# Patient Record
Sex: MALE | Race: WHITE | NOT HISPANIC OR LATINO | Employment: FULL TIME | URBAN - METROPOLITAN AREA
[De-identification: names, ages, dates, MRNs, and addresses within clinical notes are randomized per-mention and may not be internally consistent; named-entity substitution may affect disease eponyms.]

---

## 2024-03-31 ENCOUNTER — HOSPITAL ENCOUNTER (EMERGENCY)
Facility: CLINIC | Age: 39
Discharge: HOME | End: 2024-03-31
Attending: PHYSICIAN ASSISTANT | Admitting: PHYSICIAN ASSISTANT
Payer: COMMERCIAL

## 2024-03-31 ENCOUNTER — APPOINTMENT (OUTPATIENT)
Dept: CT IMAGING | Facility: CLINIC | Age: 39
End: 2024-03-31
Attending: PHYSICIAN ASSISTANT
Payer: COMMERCIAL

## 2024-03-31 ENCOUNTER — NURSE TRIAGE (OUTPATIENT)
Dept: NURSING | Facility: CLINIC | Age: 39
End: 2024-03-31
Payer: COMMERCIAL

## 2024-03-31 VITALS
RESPIRATION RATE: 18 BRPM | DIASTOLIC BLOOD PRESSURE: 76 MMHG | WEIGHT: 245 LBS | OXYGEN SATURATION: 99 % | HEART RATE: 73 BPM | SYSTOLIC BLOOD PRESSURE: 130 MMHG | BODY MASS INDEX: 32.47 KG/M2 | HEIGHT: 73 IN | TEMPERATURE: 98 F

## 2024-03-31 DIAGNOSIS — L02.214 SOFT TISSUE ABSCESS OF INGUINAL REGION: ICD-10-CM

## 2024-03-31 LAB
ANION GAP SERPL CALCULATED.3IONS-SCNC: 12 MMOL/L (ref 7–15)
BASOPHILS # BLD AUTO: 0 10E3/UL (ref 0–0.2)
BASOPHILS NFR BLD AUTO: 0 %
BUN SERPL-MCNC: 15.4 MG/DL (ref 6–20)
CALCIUM SERPL-MCNC: 8.9 MG/DL (ref 8.6–10)
CHLORIDE SERPL-SCNC: 104 MMOL/L (ref 98–107)
CREAT SERPL-MCNC: 1.33 MG/DL (ref 0.67–1.17)
DEPRECATED HCO3 PLAS-SCNC: 24 MMOL/L (ref 22–29)
EGFRCR SERPLBLD CKD-EPI 2021: 69 ML/MIN/1.73M2
EOSINOPHIL # BLD AUTO: 0.2 10E3/UL (ref 0–0.7)
EOSINOPHIL NFR BLD AUTO: 2 %
ERYTHROCYTE [DISTWIDTH] IN BLOOD BY AUTOMATED COUNT: 12.6 % (ref 10–15)
GLUCOSE SERPL-MCNC: 99 MG/DL (ref 70–99)
HCT VFR BLD AUTO: 45.3 % (ref 40–53)
HGB BLD-MCNC: 15.7 G/DL (ref 13.3–17.7)
IMM GRANULOCYTES # BLD: 0.1 10E3/UL
IMM GRANULOCYTES NFR BLD: 0 %
LACTATE SERPL-SCNC: 1.3 MMOL/L (ref 0.7–2)
LYMPHOCYTES # BLD AUTO: 3.3 10E3/UL (ref 0.8–5.3)
LYMPHOCYTES NFR BLD AUTO: 30 %
MCH RBC QN AUTO: 29.7 PG (ref 26.5–33)
MCHC RBC AUTO-ENTMCNC: 34.7 G/DL (ref 31.5–36.5)
MCV RBC AUTO: 86 FL (ref 78–100)
MONOCYTES # BLD AUTO: 1 10E3/UL (ref 0–1.3)
MONOCYTES NFR BLD AUTO: 9 %
NEUTROPHILS # BLD AUTO: 6.6 10E3/UL (ref 1.6–8.3)
NEUTROPHILS NFR BLD AUTO: 59 %
NRBC # BLD AUTO: 0 10E3/UL
NRBC BLD AUTO-RTO: 0 /100
PLATELET # BLD AUTO: 192 10E3/UL (ref 150–450)
POTASSIUM SERPL-SCNC: 3.9 MMOL/L (ref 3.4–5.3)
RBC # BLD AUTO: 5.28 10E6/UL (ref 4.4–5.9)
SODIUM SERPL-SCNC: 140 MMOL/L (ref 135–145)
WBC # BLD AUTO: 11.2 10E3/UL (ref 4–11)

## 2024-03-31 PROCEDURE — 10060 I&D ABSCESS SIMPLE/SINGLE: CPT | Performed by: PHYSICIAN ASSISTANT

## 2024-03-31 PROCEDURE — 96374 THER/PROPH/DIAG INJ IV PUSH: CPT | Mod: 59 | Performed by: PHYSICIAN ASSISTANT

## 2024-03-31 PROCEDURE — 250N000011 HC RX IP 250 OP 636: Performed by: PHYSICIAN ASSISTANT

## 2024-03-31 PROCEDURE — 74177 CT ABD & PELVIS W/CONTRAST: CPT

## 2024-03-31 PROCEDURE — 99284 EMERGENCY DEPT VISIT MOD MDM: CPT | Mod: 25 | Performed by: PHYSICIAN ASSISTANT

## 2024-03-31 PROCEDURE — 99285 EMERGENCY DEPT VISIT HI MDM: CPT | Mod: 25 | Performed by: PHYSICIAN ASSISTANT

## 2024-03-31 PROCEDURE — 80048 BASIC METABOLIC PNL TOTAL CA: CPT | Performed by: PHYSICIAN ASSISTANT

## 2024-03-31 PROCEDURE — 250N000009 HC RX 250: Performed by: PHYSICIAN ASSISTANT

## 2024-03-31 PROCEDURE — 85025 COMPLETE CBC W/AUTO DIFF WBC: CPT | Performed by: PHYSICIAN ASSISTANT

## 2024-03-31 PROCEDURE — 36415 COLL VENOUS BLD VENIPUNCTURE: CPT | Performed by: PHYSICIAN ASSISTANT

## 2024-03-31 PROCEDURE — 83605 ASSAY OF LACTIC ACID: CPT | Performed by: PHYSICIAN ASSISTANT

## 2024-03-31 RX ORDER — IOPAMIDOL 755 MG/ML
500 INJECTION, SOLUTION INTRAVASCULAR ONCE
Status: COMPLETED | OUTPATIENT
Start: 2024-03-31 | End: 2024-03-31

## 2024-03-31 RX ORDER — SULFAMETHOXAZOLE/TRIMETHOPRIM 800-160 MG
1 TABLET ORAL 2 TIMES DAILY
Qty: 14 TABLET | Refills: 0 | Status: SHIPPED | OUTPATIENT
Start: 2024-03-31

## 2024-03-31 RX ORDER — KETOROLAC TROMETHAMINE 15 MG/ML
15 INJECTION, SOLUTION INTRAMUSCULAR; INTRAVENOUS ONCE
Status: COMPLETED | OUTPATIENT
Start: 2024-03-31 | End: 2024-03-31

## 2024-03-31 RX ADMIN — SODIUM CHLORIDE 60 ML: 9 INJECTION, SOLUTION INTRAVENOUS at 21:26

## 2024-03-31 RX ADMIN — KETOROLAC TROMETHAMINE 15 MG: 15 INJECTION, SOLUTION INTRAMUSCULAR; INTRAVENOUS at 21:11

## 2024-03-31 RX ADMIN — IOPAMIDOL 100 ML: 755 INJECTION, SOLUTION INTRAVENOUS at 21:26

## 2024-03-31 ASSESSMENT — ACTIVITIES OF DAILY LIVING (ADL)
ADLS_ACUITY_SCORE: 33
ADLS_ACUITY_SCORE: 33
ADLS_ACUITY_SCORE: 35

## 2024-03-31 ASSESSMENT — COLUMBIA-SUICIDE SEVERITY RATING SCALE - C-SSRS
2. HAVE YOU ACTUALLY HAD ANY THOUGHTS OF KILLING YOURSELF IN THE PAST MONTH?: NO
1. IN THE PAST MONTH, HAVE YOU WISHED YOU WERE DEAD OR WISHED YOU COULD GO TO SLEEP AND NOT WAKE UP?: NO
6. HAVE YOU EVER DONE ANYTHING, STARTED TO DO ANYTHING, OR PREPARED TO DO ANYTHING TO END YOUR LIFE?: NO

## 2024-04-01 NOTE — TELEPHONE ENCOUNTER
"Nurse Triage SBAR    Situation: Possible hernia    Background: Pt's wife stated pt has pain on left side of testicle.  Pt felt something \"pop out\" last night, he pushed it back in and the pain went away.  It popped back out today and he can not get it back in and it is now painful in left groin.  Painful to touch.  Pain rated 4/10.  Pt can feel a lump, it is hard and dark pink.     -Patient calling  -It is okay to call back and leave a detailed message at this number:      Recommendation:     -Plans to follow recommendations  -Call back with and questions, concerns, or any change in symptoms           Reason for Disposition   Hernia is painful or tender to touch    Additional Information   Negative: SEVERE abdominal pain    Protocols used: Hernia-A-AH    "

## 2024-04-01 NOTE — ED PROVIDER NOTES
History     Chief Complaint   Patient presents with    Groin Pain       HPI  Fly Ureña is a 39 year old male who presents to the emergency department complaining of left groin pain.  Patient reports last night he felt a pop in his left groin and felt a bulge.  He figured it was a hernia and was able to pop it back in.  Today around 1 PM it popped out again and he was unable to push it back in.  He did not want to ruin Easter so a stuck it out but pain is gotten progressively worse and he is not able to push it in so he came here for evaluation.  He has had no fevers.  No nausea or vomiting.  He has had normal bowel movements and reports he is passing gas.  No history of hernia.        Allergies:  No Known Allergies    Problem List:    Patient Active Problem List    Diagnosis Date Noted    CARDIOVASCULAR SCREENING; LDL GOAL LESS THAN 160 10/31/2010     Priority: Medium    Microscopic hematuria 01/05/2009     Priority: Medium     On 1/5/09 DOT.  Will recheck, future order placed, he understands.            Past Medical History:    History reviewed. No pertinent past medical history.    Past Surgical History:    History reviewed. No pertinent surgical history.    Family History:    Family History   Problem Relation Age of Onset    Respiratory Father         slee apnea    Hypertension Father     Obesity Father     Unknown/Adopted Maternal Grandmother     Diabetes Paternal Grandfather     Heart Disease Paternal Grandfather        Social History:  Marital Status:   [2]  Social History     Tobacco Use    Smoking status: Every Day     Packs/day: 1     Types: Cigarettes    Smokeless tobacco: Never   Substance Use Topics    Alcohol use: Yes    Drug use: No        Medications:    sulfamethoxazole-trimethoprim (BACTRIM DS) 800-160 MG tablet  NO ACTIVE MEDICATIONS          Review of Systems   All other systems reviewed and are negative.        Physical Exam   BP: 130/76  Pulse: 73  Temp: 98  F (36.7  C)  Resp:  "18  Height: 185.4 cm (6' 1\")  Weight: 111.1 kg (245 lb)  SpO2: 99 %      Physical Exam  Vitals and nursing note reviewed.   Constitutional:       General: He is not in acute distress.     Appearance: Normal appearance. He is well-developed. He is not ill-appearing, toxic-appearing or diaphoretic.   HENT:      Head: Normocephalic and atraumatic.      Nose: Nose normal.   Eyes:      Conjunctiva/sclera: Conjunctivae normal.      Pupils: Pupils are equal, round, and reactive to light.   Cardiovascular:      Rate and Rhythm: Normal rate and regular rhythm.      Heart sounds: Normal heart sounds.   Pulmonary:      Effort: Pulmonary effort is normal. No respiratory distress.      Breath sounds: Normal breath sounds.   Abdominal:      General: Bowel sounds are normal. There is no distension.      Palpations: Abdomen is soft.      Tenderness: There is no abdominal tenderness.      Comments: Left inguinal region with large hard exquisitely tender mass, nonmobile, unclear if hernia or other mass.   Musculoskeletal:         General: No deformity.      Cervical back: Neck supple.   Skin:     General: Skin is warm and dry.   Neurological:      General: No focal deficit present.      Mental Status: He is alert and oriented to person, place, and time. Mental status is at baseline.      Coordination: Coordination normal.   Psychiatric:         Mood and Affect: Mood normal.           ED [facility]    PROCEDURE: -Incision/Drainage    Date/Time: 3/31/2024 10:20 PM    Performed by: Concha Falk PA-C  Authorized by: Concha Falk PA-C    Risks, benefits and alternatives discussed.      LOCATION:      Type:  Abscess    Location:  Anogenital    Anogenital location: left inguinal near perineum.    PRE-PROCEDURE DETAILS:     Skin preparation:  Chloraprep    PROCEDURE TYPE:     Complexity:  Simple    ANESTHESIA (see MAR for exact dosages):     Anesthesia method:  Local " infiltration    Local anesthetic:  Lidocaine 1% w/o epi    PROCEDURE DETAILS:     Incision types:  Stab incision    Incision depth:  Dermal    Scalpel blade:  11    Wound management:  Probed and deloculated    Drainage:  Purulent and bloody    Drainage amount:  Moderate    Wound treatment:  Wound left open    Packing materials:  1/4 in gauze    PROCEDURE    Patient Tolerance:  Patient tolerated the procedure well with no immediate complications        Results for orders placed or performed during the hospital encounter of 03/31/24 (from the past 24 hour(s))   CBC with platelets differential    Narrative    The following orders were created for panel order CBC with platelets differential.  Procedure                               Abnormality         Status                     ---------                               -----------         ------                     CBC with platelets and d...[[id number]]  Abnormal            Final result                 Please view results for these tests on the individual orders.   Basic metabolic panel   Result Value Ref Range    Sodium 140 135 - 145 mmol/L    Potassium 3.9 3.4 - 5.3 mmol/L    Chloride 104 98 - 107 mmol/L    Carbon Dioxide (CO2) 24 22 - 29 mmol/L    Anion Gap 12 7 - 15 mmol/L    Urea Nitrogen 15.4 6.0 - 20.0 mg/dL    Creatinine 1.33 (H) 0.67 - 1.17 mg/dL    GFR Estimate 69 >60 mL/min/1.73m2    Calcium 8.9 8.6 - 10.0 mg/dL    Glucose 99 70 - 99 mg/dL   Lactic acid whole blood w/ reflex x1 in 3 Hr when >2   Result Value Ref Range    Lactic Acid, Initial 1.3 0.7 - 2.0 mmol/L   CBC with platelets and differential   Result Value Ref Range    WBC Count 11.2 (H) 4.0 - 11.0 10e3/uL    RBC Count 5.28 4.40 - 5.90 10e6/uL    Hemoglobin 15.7 13.3 - 17.7 g/dL    Hematocrit 45.3 40.0 - 53.0 %    MCV 86 78 - 100 fL    MCH 29.7 26.5 - 33.0 pg    MCHC 34.7 31.5 - 36.5 g/dL    RDW 12.6 10.0 - 15.0 %    Platelet Count 192 150 - 450 10e3/uL    % Neutrophils 59 %    % Lymphocytes 30 %    %  Monocytes 9 %    % Eosinophils 2 %    % Basophils 0 %    % Immature Granulocytes 0 %    NRBCs per 100 WBC 0 <1 /100    Absolute Neutrophils 6.6 1.6 - 8.3 10e3/uL    Absolute Lymphocytes 3.3 0.8 - 5.3 10e3/uL    Absolute Monocytes 1.0 0.0 - 1.3 10e3/uL    Absolute Eosinophils 0.2 0.0 - 0.7 10e3/uL    Absolute Basophils 0.0 0.0 - 0.2 10e3/uL    Absolute Immature Granulocytes 0.1 <=0.4 10e3/uL    Absolute NRBCs 0.0 10e3/uL   CT Abdomen Pelvis w Contrast    Narrative    EXAM: CT ABDOMEN PELVIS W CONTRAST  LOCATION: [facility]  DATE: 3/31/2024    INDICATION: Left inguinal hernia, evaluate for strangulation.  COMPARISON: None.  TECHNIQUE: CT scan of the abdomen and pelvis was performed following injection of IV contrast. Multiplanar reformats were obtained. Dose reduction techniques were used.  CONTRAST: 100 mL Isovue 370.    FINDINGS:   LOWER CHEST: No infiltrates or effusions.    HEPATOBILIARY: Diffuse hepatic steatosis. No significant mass. No bile duct dilatation. No calcified gallstones.    PANCREAS: No significant mass, duct dilatation, or inflammatory change.    SPLEEN: Normal size.    ADRENAL GLANDS: No significant nodules.    KIDNEYS/BLADDER: No significant mass, stone, or hydronephrosis.    BOWEL: No obstruction or inflammatory change.    LYMPH NODES: No lymphadenopathy.    VASCULATURE: No abdominal aortic aneurysm.    PELVIC ORGANS: No pelvic masses. There is a 1.3 cm probable superficial abscess in the inguinal fold near the perineum on the left.    MUSCULOSKELETAL: No frankly destructive bony lesions.      Impression    IMPRESSION:   1.  1.3 cm suspected superficial abscess in the inguinal fold near the perineum on the left.  2.  No hernia demonstrated.       Medications   ketorolac (TORADOL) injection 15 mg (15 mg Intravenous $Given 3/31/24 2111)   iopamidol (ISOVUE-370) solution 500 mL (100 mLs Intravenous $Given 3/31/24 2126)   sodium chloride 0.9 % bag 100mL for CT scan  "flush use (60 mLs Intravenous $Given 3/31/24 2126)         Assessments & Plan (with Medical Decision Making)  Fly Ureña is a 39 year old male who presents to the ED complaining of left inguinal pain and swelling that started today around 1 PM.  He thinks it might be a hernia because he had some pain and swelling to the area last night he was able to \"pop back in.\"  Denies any abdominal pain, nausea or vomiting, or any other symptoms.  On arrival he had normal vital signs.  Exam today demonstrated tenderness with an area of hard swelling to the left inguinal region.  Exquisitely tender to touch.  No overlying skin changes.  Question incarcerated hernia versus other mass.  IV established and labs drawn for further evaluation.  He was given Toradol here for pain.  He had a mildly elevated white count of 11.2 but otherwise labs generally unremarkable.  CT scan of the abdomen pelvis was fortunately negative for any hernias however it did show a 1.3 cm superficial abscess to the area of concern.  I discussed treatment options of this abscess with the patient and he was agreeable to incision and drainage.  This was performed as detailed above, patient tolerated procedure well.  I did pack the wound and advised to remove it himself in the shower within 24 to 48 hours.  Encourage sitz bath's.  Can use over-the-counter pain relievers as needed.  I will also prescribe Bactrim for continued infection treatment.  He was provided instructions on when to return to the ED.  All questions answered and patient discharged home in suitable condition.     I have reviewed the nursing notes.    I have reviewed the findings, diagnosis, plan and need for follow up with the patient.        Discharge Medication List as of 3/31/2024 10:22 PM        START taking these medications    Details   sulfamethoxazole-trimethoprim (BACTRIM DS) 800-160 MG tablet Take 1 tablet by mouth 2 times daily, Disp-14 tablet, R-0, [facility]             Final " diagnoses:   Soft tissue abscess of inguinal region     Note: Chart documentation done in part with Dragon Voice Recognition software. Although reviewed after completion, some word and grammatical errors may remain.    3/31/2024   [facility] EMERGENCY DEPT       Concha Falk PA-C  03/31/24 [id number]

## 2024-04-01 NOTE — ED TRIAGE NOTES
C/o L groin pain thinks hernia this morning he 'popped it back into place' and pain subsided until this afternoon is now 6/10.      Triage Assessment (Adult)       Row Name 03/31/24 1959          Triage Assessment    Airway WDL WDL        Respiratory WDL    Respiratory WDL WDL        Cardiac WDL    Cardiac WDL WDL

## 2024-04-01 NOTE — DISCHARGE INSTRUCTIONS
Please take the full course of antibiotics to treat any residual infection.  Leave the packing in place for 24 to 48 hours and then you can remove it in the shower.  Try soaking in sitz baths tomorrow to help with drainage.  If you do have any worsening symptoms please return to the emergency department.    Thank you for choosing [facility]'s Emergency Department. It was a pleasure taking care of you today. If you have any questions, please call [phone].    Concha Falk PA-C

## 2024-06-29 ENCOUNTER — HEALTH MAINTENANCE LETTER (OUTPATIENT)
Age: 39
End: 2024-06-29

## 2025-07-12 ENCOUNTER — HEALTH MAINTENANCE LETTER (OUTPATIENT)
Age: 40
End: 2025-07-12